# Patient Record
Sex: FEMALE | Race: OTHER
[De-identification: names, ages, dates, MRNs, and addresses within clinical notes are randomized per-mention and may not be internally consistent; named-entity substitution may affect disease eponyms.]

---

## 2018-03-14 ENCOUNTER — HOSPITAL ENCOUNTER (OUTPATIENT)
Dept: HOSPITAL 88 - CT | Age: 43
End: 2018-03-14
Attending: INTERNAL MEDICINE
Payer: COMMERCIAL

## 2018-03-14 DIAGNOSIS — R16.0: Primary | ICD-10-CM

## 2018-03-14 DIAGNOSIS — R31.0: ICD-10-CM

## 2018-03-14 PROCEDURE — 81025 URINE PREGNANCY TEST: CPT

## 2018-03-14 PROCEDURE — 74160 CT ABDOMEN W/CONTRAST: CPT

## 2018-03-14 NOTE — DIAGNOSTIC IMAGING REPORT
PROCEDURE: CT ABDOMEN WITH CONTRAST

 

TECHNIQUE: 

The abdomen was scanned utilizing a multidetector helical scanner from 

the diaphragm to the iliac crest after the IV administration of 100 cc 

Isovue-370. Coronal and sagittal multiplanar reformations were 

obtained.

 

COMPARISON: None.

 

INDICATIONS:   ENLARGED LIVER

 

 

FINDINGS:

LOWER THORAX: Normal.

 

HEPATOBILIARY: Hepatic parenchyma is diffusely hypoattenuating relative 

to the spleen, compatible with steatosis. No focal hepatic lesion or 

intrahepatic biliary ductal dilatation. The liver is mildly enlarged, 

measuring 18.9 cm in craniocaudal span. Multiple peripherally 

radiopaque calculi are identified throughout the gallbladder lumen. No 

wall thickening or pericholecystic inflammation.

SPLEEN: No splenomegaly.

PANCREAS: No focal masses or ductal dilatation.

 

ADRENALS: No adrenal nodules.

KIDNEYS: No hydronephrosis, stones, or solid mass lesions.

 

PERITONEUM / RETROPERITONEUM: No free air or fluid.

LYMPH NODES: No upper retroperitoneal or mesenteric lymphadenopathy.

VESSELS: The abdominal aorta is non-aneurysmal. Major visceral branches 

are patent. Incidental note of a retroaortic left renal vein. Portal 

vein, splenic vein, and central superior mesenteric vein are patent.

 

GI TRACT: Diverticula of the partially visualized descending and 

sigmoid colon without wall thickening or adjacent inflammatory change. 

Normal retrocecal appendix. No small bowel dilatation.

 

BONES AND SOFT TISSUES: Postsurgical changes related to mesh repair of 

ventral abdominal wall hernia, without recurrent hernia defect. No 

osseous destructive lesion. Probable bone island in the right iliac 

wing.

 

IMPRESSION:  

 

Mild hepatomegaly with hepatic steatosis. No focal hepatic lesions.

 

Cholelithiasis.

 

Large bowel diverticulosis without findings of diverticulitis. 

Dictated by:  Amari Lew M.D. on 3/14/2018 at 14:12     

Electronically approved by:  Amari Lew M.D. on 3/14/2018 at 14:12

## 2019-03-26 ENCOUNTER — HOSPITAL ENCOUNTER (OUTPATIENT)
Dept: HOSPITAL 88 - MAMMO | Age: 44
End: 2019-03-26
Attending: OBSTETRICS & GYNECOLOGY
Payer: COMMERCIAL

## 2019-03-26 DIAGNOSIS — Z12.31: Primary | ICD-10-CM

## 2019-03-26 PROCEDURE — 77067 SCR MAMMO BI INCL CAD: CPT

## 2020-03-13 ENCOUNTER — HOSPITAL ENCOUNTER (OUTPATIENT)
Dept: HOSPITAL 88 - MAMMO | Age: 45
End: 2020-03-13
Attending: OBSTETRICS & GYNECOLOGY
Payer: COMMERCIAL

## 2020-03-13 DIAGNOSIS — Z12.31: Primary | ICD-10-CM

## 2020-03-13 PROCEDURE — 77067 SCR MAMMO BI INCL CAD: CPT

## 2020-10-07 ENCOUNTER — HOSPITAL ENCOUNTER (OUTPATIENT)
Dept: HOSPITAL 88 - US | Age: 45
End: 2020-10-07
Attending: INTERNAL MEDICINE
Payer: COMMERCIAL

## 2020-10-07 DIAGNOSIS — R10.84: Primary | ICD-10-CM

## 2020-10-07 PROCEDURE — 71046 X-RAY EXAM CHEST 2 VIEWS: CPT

## 2020-10-07 PROCEDURE — 72050 X-RAY EXAM NECK SPINE 4/5VWS: CPT

## 2020-10-07 PROCEDURE — 76700 US EXAM ABDOM COMPLETE: CPT

## 2020-10-07 NOTE — DIAGNOSTIC IMAGING REPORT
EXAM: US ABDOMEN COMPLETE



INDICATION:   Abdominal pain.



COMPARISON: None 



TECHNIQUE: Transverse and longitudinal gray scale and color doppler sonographic

images of the upper abdomen were obtained. 



FINDINGS: 

LIVER

16.6 cm in the right midclavicular line.

Increased echogenicity of the liver with normal contour, no masses.



SPLEEN

11.4 cm in maximum diameter.

Normal echogenicity, no masses.



GALLBLADDER

Cholelithiasis without gallbladder wall thickening, distention, or

pericholecystic fluid. Negative reported sonographic Vásquez's sign.



BILE DUCTS

No intra nor extra-hepatic biliary dilation.

Common bile duct measures 0.4 cm



PANCREAS: Visualized portions are normal.



RIGHT KIDNEY: 11.2 cm

Echogenicity: Normal

Collecting System:  No hydronephrosis

Stones:  None

Cyst/Mass: None



LEFT KIDNEY: 12.6 cm

Echogenicity: Normal

Collecting System:  No hydronephrosis

Stones:  None

Cyst/Mass: None



VESSELS:

Aorta: Visualized portions are within normal size limits

Inferior Vena Cava: Visualized portions are normal

Main Portal Vein: 1.0 cm, normal size with hepatopetal flow.



FREE FLUID: None



IMPRESSION:

Cholelithiasis without evidence of cholecystitis.



Hepatomegaly and hepatic steatosis.



Signed by: Dr. Brook Ochoa MD on 10/7/2020 12:15 PM

## 2020-10-07 NOTE — DIAGNOSTIC IMAGING REPORT
EXAMINATION: Cervical spine radiographs - 5 views.



CLINICAL HISTORY: Neck pain, cough



COMPARISON: None.



DISCUSSION: 

The cervical spine is visualized from the skull base to C7.



No displaced fractures or acute osseous abnormalities. Visualized cervical

vertebral body heights are preserved.

Straightening of the cervical lordosis with trace retrolisthesis at C5 on C6.

Multilevel degenerative disc changes, most notably moderate at C5-C6. Mild

multilevel uncovertebral and facet arthropathy.

Degenerative findings result in mild osseous narrowing of the right-sided

neural foramen at C5-C6.

The soft tissues are unremarkable. 

 

Subtle fractures, ligamentous or soft tissue injuries cannot be excluded on the

basis of this examination.





IMPRESSION: 

1.  No acute osseous abnormality.

2.  Multilevel degenerative disc changes, most notably moderate at C5-C6.

Additional mild multilevel uncovertebral and facet arthropathy.

3.  Degenerative findings result in mild osseous narrowing of the right-sided

neural foramen at C5-C6.

 



Signed by: Dr. Rishi Larson M.D. on 10/7/2020 1:33 PM

## 2020-10-07 NOTE — DIAGNOSTIC IMAGING REPORT
Exam: CHEST 2 VIEWS

Date: 10/7/2020 12:59 PM



INDICATION: 

^[id number]

^1210



Comparison: None



FINDINGS:



Lines/Tubes:None



Lungs:The lungs are well inflated. No focal consolidation or pulmonary edema.



Pleura:No pleural effusion. No pneumothorax.



Heart/Mediastinum:The cardiomediastinal silhouette is normal in size and

contour.



Bones/Soft Tissues: No acute osseous abnormality. Mild to moderate multilevel

degenerative changes of the spine are noted.



Upper abdomen: Unremarkable.





IMPRESSION:

Negative for acute intrathoracic process.



Signed by: Carlton Gee MD on 10/7/2020 12:59 PM

## 2020-10-15 ENCOUNTER — HOSPITAL ENCOUNTER (OUTPATIENT)
Dept: HOSPITAL 88 - NM | Age: 45
End: 2020-10-15
Attending: INTERNAL MEDICINE
Payer: COMMERCIAL

## 2020-10-15 DIAGNOSIS — K80.20: Primary | ICD-10-CM

## 2020-10-15 PROCEDURE — 78227 HEPATOBIL SYST IMAGE W/DRUG: CPT

## 2020-10-15 PROCEDURE — 78226 HEPATOBILIARY SYSTEM IMAGING: CPT

## 2020-10-15 NOTE — DIAGNOSTIC IMAGING REPORT
Hepatobiliary Scan with Gallbladder Ejection Fraction



Reason for exam: Cholelithiasis; abdominal pain



Report: Following intravenous administration of 6.4 millicuries of Tc-99m

mebrofenin, dynamic images of the abdomen in the anterior projection were

obtained through 40 minutes.  Sincalide (CCK analog) 2.0 micrograms was

administered intravenously over 30 minutes with additional imaging for

determination of gallbladder ejection fraction.



Perfusion to the liver is normal.  Extraction of tracer from the blood pool by

the liver parenchyma is normal.  Tracer is seen promptly within the biliary

tract.  The gallbladder begins to fill by 25 minutes post-injection of tracer

and fills adequately.  Tracer is seen in the small bowel by 9 minutes.  The

gallbladder ejection fraction with administration of sincalide is 92% (normal

greater than 40%).



Impression: 



1.  Filling of the gallbladder excludes the diagnosis of acute cystic duct

obstruction/acute cholecystitis.

2.  Normal gallbladder ejection fraction of 92% does not support the clinical

diagnosis of chronic cholecystitis/gallbladder dyskinesia.



Signed by: Dr. Katie Tejeda M.D. on 10/15/2020 9:02 PM